# Patient Record
Sex: MALE | Race: WHITE
[De-identification: names, ages, dates, MRNs, and addresses within clinical notes are randomized per-mention and may not be internally consistent; named-entity substitution may affect disease eponyms.]

---

## 2019-03-02 ENCOUNTER — HOSPITAL ENCOUNTER (EMERGENCY)
Dept: HOSPITAL 47 - EC | Age: 49
Discharge: HOME | End: 2019-03-02
Payer: MEDICARE

## 2019-03-02 VITALS — DIASTOLIC BLOOD PRESSURE: 88 MMHG | SYSTOLIC BLOOD PRESSURE: 137 MMHG | HEART RATE: 64 BPM

## 2019-03-02 VITALS — TEMPERATURE: 98.3 F | RESPIRATION RATE: 18 BRPM
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
## 2019-03-02 DIAGNOSIS — S16.1XXA: Primary | ICD-10-CM

## 2019-03-02 DIAGNOSIS — S29.012A: ICD-10-CM
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
## 2019-03-02 DIAGNOSIS — F17.200: ICD-10-CM

## 2019-03-02 PROCEDURE — 99283 EMERGENCY DEPT VISIT LOW MDM: CPT

## 2019-03-02 PROCEDURE — 96372 THER/PROPH/DIAG INJ SC/IM: CPT

## 2019-03-02 NOTE — ED
General Adult HPI





- General


Chief complaint: Neck Pain/Injury


Stated complaint: Fell back injury


Time Seen by Provider: 03/02/19 10:45


Source: patient, RN notes reviewed


Mode of arrival: ambulatory


Limitations: no limitations





- History of Present Illness


Initial comments: 





This is a 49-year-old male who presents emergency Department complaining of 

paraspinous muscles of the neck and mid back are sore after he slipped in a 

store yesterday.  Patient states he did not fall but he twisted his back and 

since then it's been painful.  Patient states it hurts to twist or turn or bend 

his neck per patient denies any numbness or weakness.  Patient denies any focal 

neurologic deficit at all.  Patient denies any central spinal pain in the neck 

or back.  Patient denies any other injury.





- Related Data


 Previous Rx's











 Medication  Instructions  Recorded


 


Cyclobenzaprine [Flexeril] 10 mg PO TID #20 tab 03/02/19


 


Ketorolac [Toradol] 10 mg PO Q6HR #15 tab 03/02/19











 Allergies











Allergy/AdvReac Type Severity Reaction Status Date / Time


 


No Known Allergies Allergy   Verified 03/02/19 10:46














Review of Systems


ROS Statement: 


Those systems with pertinent positive or pertinent negative responses have been 

documented in the HPI.





ROS Other: All systems not noted in ROS Statement are negative.





Past Medical History


Past Medical History: Asthma, Hypertension


Additional Past Medical History / Comment(s): chronic neck and back pain


History of Any Multi-Drug Resistant Organisms: None Reported


Additional Past Surgical History / Comment(s): testicular surgery


Past Psychological History: Anxiety, Depression


Smoking Status: Current every day smoker


Past Alcohol Use History: Occasional


Past Drug Use History: Marijuana





General Exam





- General Exam Comments


Initial Comments: 





GENERAL:


Patient is well-developed and well-nourished.  Patient is nontoxic and well-

hydrated and is in mild distress.





ENT:


Neck is soft and supple.  No significant lymphadenopathy is noted.  Oropharynx 

is clear.  Moist mucous membranes.  Neck has full range of motion without 

eliciting any pain.  Patient was neck is tender in the paraspinous muscles on 

the left side of the neck.





EYES:


The sclera were anicteric and conjunctiva were pink and moist.  Extraocular 

movements were intact and pupils were equal round and reactive to light.  

Eyelids were unremarkable.





PULMONARY:


Unlabored respirations.  Good breath sounds bilaterally.  No audible rales 

rhonchi or wheezing was noted.





CARDIOVASCULAR:


There is a regular rate and rhythm without any murmurs gallops or rubs. 





ABDOMEN:


Soft and nontender with normal bowel sounds.  No palpable organomegaly was 

noted.  There is no palpable pulsatile mass.





SKIN:


Skin is clear with no lesions or rashes and otherwise unremarkable.





NEUROLOGIC:


Patient is alert and oriented x3.  Cranial nerves II through XII are grossly 

intact.  Motor and sensory are also intact.  Normal speech, volume and content.

  Symmetrical smile. 





MUSCULOSKELETAL:


Normal extremities with adequate strength and full range of motion.  No lower 

extremity swelling or edema.  No calf tenderness.  Patient has some tenderness 

in the paraspinous muscles on the right side of the spine in the mid thoracic 

region.





LYMPHATICS:


No significant lymphadenopathy is noted





PSYCHIATRIC:


Normal psychiatric evaluation. 


Limitations: no limitations





Course





 Vital Signs











  03/02/19





  10:43


 


Temperature 98.3 F


 


Pulse Rate 71


 


Respiratory 18





Rate 


 


Blood Pressure 148/98


 


O2 Sat by Pulse 98





Oximetry 














Disposition


Clinical Impression: 


 Cervical strain, Thoracic myofascial strain





Disposition: HOME SELF-CARE


Condition: Good


Instructions (If sedation given, give patient instructions):  Cervical Strain (

ED)


Prescriptions: 


Cyclobenzaprine [Flexeril] 10 mg PO TID #20 tab


Ketorolac [Toradol] 10 mg PO Q6HR #15 tab


Is patient prescribed a controlled substance at d/c from ED?: No


Referrals: 


None,Stated [Primary Care Provider] - 1-2 days


Time of Disposition: 11:27

## 2019-03-22 ENCOUNTER — HOSPITAL ENCOUNTER (EMERGENCY)
Dept: HOSPITAL 47 - EC | Age: 49
Discharge: HOME | End: 2019-03-22
Payer: MEDICARE

## 2019-03-22 VITALS
DIASTOLIC BLOOD PRESSURE: 76 MMHG | TEMPERATURE: 98 F | SYSTOLIC BLOOD PRESSURE: 121 MMHG | RESPIRATION RATE: 20 BRPM | HEART RATE: 82 BPM

## 2019-03-22 DIAGNOSIS — J45.901: Primary | ICD-10-CM

## 2019-03-22 DIAGNOSIS — F17.200: ICD-10-CM

## 2019-03-22 DIAGNOSIS — I10: ICD-10-CM

## 2019-03-22 DIAGNOSIS — Z79.899: ICD-10-CM

## 2019-03-22 PROCEDURE — 94640 AIRWAY INHALATION TREATMENT: CPT

## 2019-03-22 PROCEDURE — 71046 X-RAY EXAM CHEST 2 VIEWS: CPT

## 2019-03-22 PROCEDURE — 96372 THER/PROPH/DIAG INJ SC/IM: CPT

## 2019-03-22 PROCEDURE — 99285 EMERGENCY DEPT VISIT HI MDM: CPT

## 2019-03-22 NOTE — ED
General Adult HPI





- General


Chief complaint: Shortness of Breath


Stated complaint: SOB/asthma


Time Seen by Provider: 03/22/19 09:59


Source: patient, RN notes reviewed, old records reviewed


Mode of arrival: ambulatory


Limitations: no limitations





- History of Present Illness


Initial comments: 





49-year-old male history of asthma presenting for evaluation of cough, dyspnea, 

URI symptoms.  Patient's symptoms have been present for the past one week.  He 

endorses rhinorrhea, sore throat, myalgias and subjective fever and chills.  

He's had cough and central chest congestion.  No central chest pain, no 

radiating chest pain.  No history of CAD, history of hypertension and asthma.  

He is a current smoker.  Symptoms have been present for one week.  Patient e

ndorses similar symptoms on an annual basis around this time of year.





- Related Data


                                Home Medications











 Medication  Instructions  Recorded  Confirmed


 


Albuterol Inhaler [Ventolin Hfa 1 - 2 puff INHALATION RT-Q6H PRN 03/22/19 03/22/19





Inhaler]   


 


Lisinopril [Zestril] 10 mg PO DAILY 03/22/19 03/22/19








                                  Previous Rx's











 Medication  Instructions  Recorded


 


Cyclobenzaprine [Flexeril] 10 mg PO TID #20 tab 03/02/19


 


Ketorolac [Toradol] 10 mg PO Q6HR #15 tab 03/02/19


 


Albuterol Inhaler [Ventolin Hfa 1 - 2 puff INHALATION Q4HR PRN #1 03/22/19





Inhaler] inhaler 


 


Azithromycin [Zithromax Z-pack] 0 mg PO AS DIRECTED #6 tab 03/22/19


 


predniSONE 50 mg PO DAILY #5 tab 03/22/19











                                    Allergies











Allergy/AdvReac Type Severity Reaction Status Date / Time


 


No Known Allergies Allergy   Verified 03/22/19 10:15














Review of Systems


ROS Statement: 


Those systems with pertinent positive or pertinent negative responses have been 

documented in the HPI.





ROS Other: All systems not noted in ROS Statement are negative.





Past Medical History


Past Medical History: Asthma, Hypertension


Additional Past Medical History / Comment(s): chronic neck and back pain


History of Any Multi-Drug Resistant Organisms: None Reported


Additional Past Surgical History / Comment(s): testicular surgery


Past Psychological History: Anxiety, Depression


Smoking Status: Current every day smoker


Past Alcohol Use History: Occasional


Past Drug Use History: Marijuana





General Exam


Limitations: no limitations


General appearance: alert, in no apparent distress


Head exam: Present: atraumatic, normocephalic


Eye exam: Present: normal appearance, PERRL


ENT exam: Present: mucous membranes moist.  Absent: normal oropharynx (Delgado e

rythema, no tonsillar swelling or exudate, bilateral nasal congestion)


Neck exam: Present: normal inspection.  Absent: tenderness, meningismus


Respiratory exam: Present: wheezes, rhonchi, decreased breath sounds.  Absent: 

respiratory distress, accessory muscle use


Cardiovascular Exam: Present: regular rate, normal rhythm


GI/Abdominal exam: Present: soft.  Absent: distended, tenderness, guarding


Extremities exam: Present: normal inspection, normal capillary refill.  Absent: 

pedal edema, calf tenderness


Neurological exam: Present: alert, oriented X3, CN II-XII intact.  Absent: motor

sensory deficit


Psychiatric exam: Present: normal affect, normal mood


Skin exam: Present: warm, dry, intact.  Absent: cyanosis, diaphoretic





Course


                                   Vital Signs











  03/22/19 03/22/19 03/22/19





  09:49 10:35 10:52


 


Temperature 97.9 F  


 


Pulse Rate 82 88 88


 


Respiratory 18  





Rate   


 


Blood Pressure 136/78  


 


O2 Sat by Pulse 95  





Oximetry   














Medical Decision Making





- Medical Decision Making





31-year-old male history of asthma, current smoker presenting with 1 week of URI

symptoms, cough.  Patient has bilateral wheezing and rhonchi on exam.  No 

respiratory distress.  Chest x-ray negative for focal pneumonia.  After 

albuterol, Atrovent, Decadron, he is feeling better with improved air entry.  

Will be discharged with steroids, albuterol, and antibiotic.  Please return with

worsening or changing symptoms.





Disposition


Clinical Impression: 


 Asthma with exacerbation, Bronchitis





Disposition: HOME SELF-CARE


Condition: Good


Instructions (If sedation given, give patient instructions):  Asthma (ED), Acute

Bronchitis (ED)


Prescriptions: 


predniSONE 50 mg PO DAILY #5 tab


Albuterol Inhaler [Ventolin Hfa Inhaler] 1 - 2 puff INHALATION Q4HR PRN #1 

inhaler


 PRN Reason: Shortness Of Breath


Azithromycin [Zithromax Z-pack] 0 mg PO AS DIRECTED #6 tab


Is patient prescribed a controlled substance at d/c from ED?: No


Referrals: 


Nonstaff,Physician [REFERRING] - 1-2 days


Brandi Johnson MD [STAFF PHYSICIAN] - 1-2 days


Time of Disposition: 10:57

## 2019-03-22 NOTE — XR
EXAMINATION TYPE: XR chest 2V

 

DATE OF EXAM: 3/22/2019

 

COMPARISON: NONE

 

HISTORY: Cough and congestion for 2 days.

 

TECHNIQUE:  Frontal and lateral views of the chest are obtained.

 

FINDINGS:  There is no focal air space opacity, pleural effusion, or pneumothorax seen.  The cardiac 
silhouette size is within normal limits.   The osseous structures are intact.

 

IMPRESSION:  No suspicious acute infiltrate.